# Patient Record
Sex: FEMALE | Race: WHITE | NOT HISPANIC OR LATINO | Employment: PART TIME | URBAN - METROPOLITAN AREA
[De-identification: names, ages, dates, MRNs, and addresses within clinical notes are randomized per-mention and may not be internally consistent; named-entity substitution may affect disease eponyms.]

---

## 2017-03-21 ENCOUNTER — OFFICE VISIT (OUTPATIENT)
Dept: FAMILY MEDICINE CLINIC | Facility: CLINIC | Age: 42
End: 2017-03-21

## 2017-03-21 VITALS
RESPIRATION RATE: 16 BRPM | HEART RATE: 68 BPM | TEMPERATURE: 98.4 F | DIASTOLIC BLOOD PRESSURE: 80 MMHG | SYSTOLIC BLOOD PRESSURE: 130 MMHG | BODY MASS INDEX: 26.86 KG/M2 | WEIGHT: 177.2 LBS | HEIGHT: 68 IN

## 2017-03-21 DIAGNOSIS — G89.29 CHRONIC LEFT-SIDED THORACIC BACK PAIN: ICD-10-CM

## 2017-03-21 DIAGNOSIS — M25.562 ACUTE PAIN OF LEFT KNEE: ICD-10-CM

## 2017-03-21 DIAGNOSIS — Z76.89 ENCOUNTER TO ESTABLISH CARE: Primary | ICD-10-CM

## 2017-03-21 DIAGNOSIS — M54.6 CHRONIC LEFT-SIDED THORACIC BACK PAIN: ICD-10-CM

## 2017-03-21 DIAGNOSIS — R11.2 NON-INTRACTABLE VOMITING WITH NAUSEA, UNSPECIFIED VOMITING TYPE: ICD-10-CM

## 2017-03-21 PROCEDURE — 99204 OFFICE O/P NEW MOD 45 MIN: CPT | Performed by: NURSE PRACTITIONER
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
## 2017-04-21 ENCOUNTER — HOSPITAL ENCOUNTER (OUTPATIENT)
Dept: GENERAL RADIOLOGY | Facility: HOSPITAL | Age: 42
Discharge: HOME | End: 2017-04-21
Admitting: NURSE PRACTITIONER

## 2017-04-21 ENCOUNTER — OFFICE VISIT (OUTPATIENT)
Dept: FAMILY MEDICINE CLINIC | Facility: CLINIC | Age: 42
End: 2017-04-21

## 2017-04-21 VITALS
TEMPERATURE: 98.2 F | WEIGHT: 168 LBS | BODY MASS INDEX: 25.46 KG/M2 | SYSTOLIC BLOOD PRESSURE: 130 MMHG | HEIGHT: 68 IN | DIASTOLIC BLOOD PRESSURE: 80 MMHG | RESPIRATION RATE: 16 BRPM | HEART RATE: 80 BPM

## 2017-04-21 DIAGNOSIS — R07.89 CHEST TIGHTNESS OR PRESSURE: Primary | ICD-10-CM

## 2017-04-21 DIAGNOSIS — Z72.0 TOBACCO ABUSE: ICD-10-CM

## 2017-04-21 DIAGNOSIS — F41.9 ANXIETY: ICD-10-CM

## 2017-04-21 PROCEDURE — 99214 OFFICE O/P EST MOD 30 MIN: CPT | Performed by: NURSE PRACTITIONER

## 2017-04-21 PROCEDURE — 99406 BEHAV CHNG SMOKING 3-10 MIN: CPT | Performed by: NURSE PRACTITIONER

## 2017-04-21 PROCEDURE — 71020 HC CHEST PA AND LATERAL: CPT

## 2017-04-21 PROCEDURE — 93000 ELECTROCARDIOGRAM COMPLETE: CPT | Performed by: NURSE PRACTITIONER

## 2017-04-21 RX ORDER — TIZANIDINE 4 MG/1
4 TABLET ORAL EVERY 8 HOURS PRN
Qty: 60 TABLET | Refills: 0 | Status: SHIPPED | OUTPATIENT
Start: 2017-04-21 | End: 2019-07-09

## 2017-04-21 RX ORDER — DULOXETIN HYDROCHLORIDE 30 MG/1
30 CAPSULE, DELAYED RELEASE ORAL DAILY
Qty: 30 CAPSULE | Refills: 1 | Status: SHIPPED | OUTPATIENT
Start: 2017-04-21 | End: 2017-04-24
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
## 2017-04-24 ENCOUNTER — OFFICE VISIT (OUTPATIENT)
Dept: FAMILY MEDICINE CLINIC | Facility: CLINIC | Age: 42
End: 2017-04-24

## 2017-04-24 VITALS
TEMPERATURE: 98.5 F | HEIGHT: 68 IN | BODY MASS INDEX: 26.16 KG/M2 | SYSTOLIC BLOOD PRESSURE: 118 MMHG | HEART RATE: 88 BPM | WEIGHT: 172.6 LBS | RESPIRATION RATE: 16 BRPM | DIASTOLIC BLOOD PRESSURE: 78 MMHG

## 2017-04-24 DIAGNOSIS — R10.9 LEFT SIDED ABDOMINAL PAIN: Primary | ICD-10-CM

## 2017-04-24 DIAGNOSIS — K58.2 IRRITABLE BOWEL SYNDROME WITH BOTH CONSTIPATION AND DIARRHEA: ICD-10-CM

## 2017-04-24 DIAGNOSIS — G89.29 CHRONIC LEFT-SIDED THORACIC BACK PAIN: ICD-10-CM

## 2017-04-24 DIAGNOSIS — M54.6 CHRONIC LEFT-SIDED THORACIC BACK PAIN: ICD-10-CM

## 2017-04-24 DIAGNOSIS — Z13.29 SCREENING FOR THYROID DISORDER: ICD-10-CM

## 2017-04-24 PROCEDURE — 99214 OFFICE O/P EST MOD 30 MIN: CPT | Performed by: FAMILY MEDICINE

## 2017-04-24 RX ORDER — DICYCLOMINE HCL 20 MG
20 TABLET ORAL EVERY 6 HOURS
Qty: 120 TABLET | Refills: 1 | Status: SHIPPED | OUTPATIENT
Start: 2017-04-24 | End: 2017-08-25 | Stop reason: SDUPTHER

## 2017-04-24 NOTE — PROGRESS NOTES
Subjective   Pari Ortiz is a 41 y.o. female.     History of Present Illness   Here for follow up of left sided chest pain and left back pain  She was recently seen in our office 3 days ago, evaluated with CXR and EKG. Was thought that it was related to anxiety, started Duloxetine. Patient states that she was having a bad day when she was in office last and doesn't want to be treated with duloxetine. She states that she doesn't have depression or anxiety, so doesn't want to take the medication.   Pain has been on and off for years, progressively getting worse. She can push her LUQ and feel pain in her spine.   She started a more physical job in Jan 2017, symptoms have worsened.   Tizanidine has improved symptoms some.     She also has stomach pain with diarrhea and constipation.   Long history of constipation and diarrhea, not related to any specific foods. When diarrhea occurs, it is described as very watery.  +abdominal bloating and discomfort  Denies acid reflux symptoms  She has had a colonoscopy and EGD done in 2011, biopsies were taken but she didn't go to the follow up appointment because she lost insurance coverage.   +Family history Diverticulitis and IBS     The following portions of the patient's history were reviewed and updated as appropriate: allergies, current medications, past family history, past medical history, past social history, past surgical history and problem list.    Review of Systems   Constitutional: Negative for chills and fever.   Respiratory: Negative.    Cardiovascular: Negative.    Gastrointestinal: Positive for abdominal distention, abdominal pain, constipation and diarrhea. Negative for nausea and vomiting.   Musculoskeletal: Positive for back pain.   Neurological: Negative.  Negative for weakness and numbness.       Objective   Physical Exam   Constitutional: She is oriented to person, place, and time. She appears well-developed and well-nourished.   HENT:   Head:  Normocephalic and atraumatic.   Right Ear: External ear normal.   Left Ear: External ear normal.   Nose: Nose normal.   Eyes: Conjunctivae and EOM are normal.   Cardiovascular: Normal rate, regular rhythm and normal heart sounds.    Pulmonary/Chest: Effort normal and breath sounds normal.   Abdominal: Soft. Bowel sounds are normal. She exhibits no distension and no mass. There is no tenderness. There is no rebound and no guarding.   Musculoskeletal: She exhibits no deformity.        Thoracic back: She exhibits tenderness and spasm (left lower thoracic).   Neurological: She is alert and oriented to person, place, and time. No cranial nerve deficit.   Skin: Skin is warm and dry.   Psychiatric: She has a normal mood and affect. Her behavior is normal.   Nursing note and vitals reviewed.      Assessment/Plan   Pari was seen today for follow up on l side chest pain & back pain.    Diagnoses and all orders for this visit:    Left sided abdominal pain  -     Food Allergy Profile  -     CBC Auto Differential  -     Comprehensive Metabolic Panel    Chronic left-sided thoracic back pain  -     CBC Auto Differential  -     Comprehensive Metabolic Panel  -     Ambulatory Referral to Physical Therapy Evaluate and treat    Irritable bowel syndrome with both constipation and diarrhea  -     Food Allergy Profile  -     CBC Auto Differential  -     Comprehensive Metabolic Panel  -     dicyclomine (BENTYL) 20 MG tablet; Take 1 tablet by mouth Every 6 (Six) Hours.    Screening for thyroid disorder  -     TSH      From her description, the back pain seems to be musculoskeletal in nature. Referred to PT for further evaluation and treatment. Continue Zanaflex as needed.   Labs ordered to evaluate abdominal pain. Possibly IBS, has had previous scopes but unsure what final diagnosis was. Will start treatment with Bentyl, she is to notify me if symptoms worsen. Consider referring back to gastroenterology if symptoms don't improve.  Encouraged her to follow FODMAP diet to improve abdominal pain and bloating.

## 2017-04-24 NOTE — PATIENT INSTRUCTIONS
Go to the nearest ER or return to clinic if symptoms worsen, fever/chill develop    Diet for Irritable Bowel Syndrome  When you have irritable bowel syndrome (IBS), the foods you eat and your eating habits are very important. IBS may cause various symptoms, such as abdominal pain, constipation, or diarrhea. Choosing the right foods can help ease discomfort caused by these symptoms. Work with your health care provider and dietitian to find the best eating plan to help control your symptoms.  WHAT GENERAL GUIDELINES DO I NEED TO FOLLOW?  · Keep a food diary. This will help you identify foods that cause symptoms. Write down:    What you eat and when.    What symptoms you have.    When symptoms occur in relation to your meals.  · Avoid foods that cause symptoms. Talk with your dietitian about other ways to get the same nutrients that are in these foods.  · Eat more foods that contain fiber. Take a fiber supplement if directed by your dietitian.  · Eat your meals slowly, in a relaxed setting.  · Aim to eat 5-6 small meals per day. Do not skip meals.  · Drink enough fluids to keep your urine clear or pale yellow.  · Ask your health care provider if you should take an over-the-counter probiotic during flare-ups to help restore healthy gut bacteria.  · If you have cramping or diarrhea, try making your meals low in fat and high in carbohydrates. Examples of carbohydrates are pasta, rice, whole grain breads and cereals, fruits, and vegetables.  · If dairy products cause your symptoms to flare up, try eating less of them. You might be able to handle yogurt better than other dairy products because it contains bacteria that help with digestion.  WHAT FOODS ARE NOT RECOMMENDED?  The following are some foods and drinks that may worsen your symptoms:  · Fatty foods, such as French fries.  · Milk products, such as cheese or ice cream.  · Chocolate.  · Alcohol.  · Products with caffeine, such as coffee.  · Carbonated drinks, such as  soda.  The items listed above may not be a complete list of foods and beverages to avoid. Contact your dietitian for more information.  WHAT FOODS ARE GOOD SOURCES OF FIBER?  Your health care provider or dietitian may recommend that you eat more foods that contain fiber. Fiber can help reduce constipation and other IBS symptoms. Add foods with fiber to your diet a little at a time so that your body can get used to them. Too much fiber at once might cause gas and swelling of your abdomen. The following are some foods that are good sources of fiber:  · Apples.  · Peaches.  · Pears.  · Berries.  · Figs.  · Broccoli (raw).  · Cabbage.  · Carrots.  · Raw peas.  · Kidney beans.  · Lima beans.  · Whole grain bread.  · Whole grain cereal.  FOR MORE INFORMATION   International Foundation for Functional Gastrointestinal Disorders: www.iffgd.org  National [place] of Diabetes and Digestive and Kidney Diseases: www.niddk.nih.gov/health-information/health-topics/digestive-diseases/ibs/Pages/facts.aspx     This information is not intended to replace advice given to you by your health care provider. Make sure you discuss any questions you have with your health care provider.     Document Released: 03/09/2005 Document Revised: 01/08/2016 Document Reviewed: 03/20/2015  [employer] Interactive Patient Education ©2016 [employer]

## 2017-04-26 LAB
ALBUMIN SERPL-MCNC: 4.4 G/DL (ref 3.5–5.5)
ALBUMIN/GLOB SERPL: 1.8 {RATIO} (ref 1.2–2.2)
ALP SERPL-CCNC: 47 IU/L (ref 39–117)
ALT SERPL-CCNC: 11 IU/L (ref 0–32)
AST SERPL-CCNC: 12 IU/L (ref 0–40)
BASOPHILS # BLD AUTO: 0 X10E3/UL (ref 0–0.2)
BASOPHILS NFR BLD AUTO: 1 %
BILIRUB SERPL-MCNC: 0.3 MG/DL (ref 0–1.2)
BUN SERPL-MCNC: 8 MG/DL (ref 6–20)
BUN/CREAT SERPL: 13 (ref 9–23)
CALCIUM SERPL-MCNC: 9.2 MG/DL (ref 8.7–10.2)
CHLORIDE SERPL-SCNC: 104 MMOL/L (ref 96–106)
CLAM IGE QN: <0.1 KU/L
CO2 SERPL-SCNC: 21 MMOL/L (ref 18–29)
CODFISH IGE QN: <0.1 KU/L
CONV CLASS DESCRIPTION: NORMAL
CORN IGE QN: <0.1 KU/L
COW MILK IGE QN: <0.1 KU/L
CREAT SERPL-MCNC: 0.61 MG/DL (ref 0.57–1)
EGG WHITE IGE QN: <0.1 KU/L
EOSINOPHIL # BLD AUTO: 0.1 X10E3/UL (ref 0–0.4)
EOSINOPHIL NFR BLD AUTO: 2 %
ERYTHROCYTE [DISTWIDTH] IN BLOOD BY AUTOMATED COUNT: 12.6 % (ref 12.3–15.4)
GLOBULIN SER CALC-MCNC: 2.5 G/DL (ref 1.5–4.5)
GLUCOSE SERPL-MCNC: 80 MG/DL (ref 65–99)
HCT VFR BLD AUTO: 41.8 % (ref 34–46.6)
HGB BLD-MCNC: 14.3 G/DL (ref 11.1–15.9)
IMM GRANULOCYTES # BLD: 0 X10E3/UL (ref 0–0.1)
IMM GRANULOCYTES NFR BLD: 0 %
LYMPHOCYTES # BLD AUTO: 2.1 X10E3/UL (ref 0.7–3.1)
LYMPHOCYTES NFR BLD AUTO: 29 %
MCH RBC QN AUTO: 33.6 PG (ref 26.6–33)
MCHC RBC AUTO-ENTMCNC: 34.2 G/DL (ref 31.5–35.7)
MCV RBC AUTO: 98 FL (ref 79–97)
MONOCYTES # BLD AUTO: 0.5 X10E3/UL (ref 0.1–0.9)
MONOCYTES NFR BLD AUTO: 7 %
NEUTROPHILS # BLD AUTO: 4.5 X10E3/UL (ref 1.4–7)
NEUTROPHILS NFR BLD AUTO: 61 %
PEANUT IGE QN: <0.1 KU/L
PLATELET # BLD AUTO: 305 X10E3/UL (ref 150–379)
POTASSIUM SERPL-SCNC: 4.8 MMOL/L (ref 3.5–5.2)
PROT SERPL-MCNC: 6.9 G/DL (ref 6–8.5)
RBC # BLD AUTO: 4.26 X10E6/UL (ref 3.77–5.28)
SCALLOP IGE QN: <0.1 KU/L
SESAME SEED IGE QN: <0.1 KU/L
SHRIMP IGE QN: <0.1 KU/L
SODIUM SERPL-SCNC: 142 MMOL/L (ref 134–144)
SOYBEAN IGE QN: <0.1 KU/L
TSH SERPL DL<=0.005 MIU/L-ACNC: 1.74 UIU/ML (ref 0.45–4.5)
WALNUT IGE QN: <0.1 KU/L
WBC # BLD AUTO: 7.3 X10E3/UL (ref 3.4–10.8)
WHEAT IGE QN: <0.1 KU/L

## 2017-05-03 ENCOUNTER — TELEPHONE (OUTPATIENT)
Dept: FAMILY MEDICINE CLINIC | Facility: CLINIC | Age: 42
End: 2017-05-03

## 2017-05-03 ENCOUNTER — OFFICE VISIT (OUTPATIENT)
Dept: FAMILY MEDICINE CLINIC | Facility: CLINIC | Age: 42
End: 2017-05-03

## 2017-05-03 VITALS
BODY MASS INDEX: 25.22 KG/M2 | WEIGHT: 166.4 LBS | HEIGHT: 68 IN | TEMPERATURE: 98.4 F | RESPIRATION RATE: 16 BRPM | HEART RATE: 80 BPM | SYSTOLIC BLOOD PRESSURE: 120 MMHG | DIASTOLIC BLOOD PRESSURE: 75 MMHG

## 2017-05-03 DIAGNOSIS — M54.6 CHRONIC LEFT-SIDED THORACIC BACK PAIN: Primary | ICD-10-CM

## 2017-05-03 DIAGNOSIS — M54.16 LUMBAR RADICULAR PAIN: ICD-10-CM

## 2017-05-03 DIAGNOSIS — M54.14 THORACIC RADICULITIS: ICD-10-CM

## 2017-05-03 DIAGNOSIS — M54.50 CHRONIC MIDLINE LOW BACK PAIN WITHOUT SCIATICA: ICD-10-CM

## 2017-05-03 DIAGNOSIS — G89.29 CHRONIC LEFT-SIDED THORACIC BACK PAIN: Primary | ICD-10-CM

## 2017-05-03 DIAGNOSIS — G89.29 CHRONIC MIDLINE LOW BACK PAIN WITHOUT SCIATICA: ICD-10-CM

## 2017-05-03 PROCEDURE — 99214 OFFICE O/P EST MOD 30 MIN: CPT | Performed by: FAMILY MEDICINE

## 2017-05-03 RX ORDER — NAPROXEN 500 MG/1
TABLET ORAL
Refills: 0 | COMMUNITY
Start: 2017-03-17 | End: 2017-08-07

## 2017-05-03 RX ORDER — PREDNISONE 20 MG/1
TABLET ORAL
Qty: 16 TABLET | Refills: 0 | Status: SHIPPED | OUTPATIENT
Start: 2017-05-03 | End: 2017-05-25

## 2017-05-08 ENCOUNTER — TELEPHONE (OUTPATIENT)
Dept: FAMILY MEDICINE CLINIC | Facility: CLINIC | Age: 42
End: 2017-05-08

## 2017-05-08 DIAGNOSIS — M54.14 THORACIC RADICULITIS: ICD-10-CM

## 2017-05-08 DIAGNOSIS — G89.29 CHRONIC LEFT-SIDED THORACIC BACK PAIN: Primary | ICD-10-CM

## 2017-05-08 DIAGNOSIS — M54.6 CHRONIC LEFT-SIDED THORACIC BACK PAIN: Primary | ICD-10-CM

## 2017-05-08 DIAGNOSIS — M54.50 CHRONIC MIDLINE LOW BACK PAIN WITHOUT SCIATICA: ICD-10-CM

## 2017-05-08 DIAGNOSIS — G89.29 CHRONIC MIDLINE LOW BACK PAIN WITHOUT SCIATICA: ICD-10-CM

## 2017-05-12 ENCOUNTER — TELEPHONE (OUTPATIENT)
Dept: FAMILY MEDICINE CLINIC | Facility: CLINIC | Age: 42
End: 2017-05-12

## 2017-05-25 ENCOUNTER — HOSPITAL ENCOUNTER (OUTPATIENT)
Dept: CT IMAGING | Facility: HOSPITAL | Age: 42
Discharge: HOME | End: 2017-05-25
Attending: FAMILY MEDICINE | Admitting: FAMILY MEDICINE

## 2017-05-25 ENCOUNTER — OFFICE VISIT (OUTPATIENT)
Dept: FAMILY MEDICINE CLINIC | Facility: CLINIC | Age: 42
End: 2017-05-25

## 2017-05-25 ENCOUNTER — TELEPHONE (OUTPATIENT)
Dept: FAMILY MEDICINE CLINIC | Facility: CLINIC | Age: 42
End: 2017-05-25

## 2017-05-25 VITALS
BODY MASS INDEX: 26.23 KG/M2 | WEIGHT: 170 LBS | SYSTOLIC BLOOD PRESSURE: 122 MMHG | HEART RATE: 84 BPM | OXYGEN SATURATION: 99 % | TEMPERATURE: 97.2 F | DIASTOLIC BLOOD PRESSURE: 72 MMHG

## 2017-05-25 DIAGNOSIS — R11.2 NAUSEA AND VOMITING, INTRACTABILITY OF VOMITING NOT SPECIFIED, UNSPECIFIED VOMITING TYPE: ICD-10-CM

## 2017-05-25 DIAGNOSIS — R10.9 LEFT LATERAL ABDOMINAL PAIN: Primary | ICD-10-CM

## 2017-05-25 DIAGNOSIS — R19.7 DIARRHEA, UNSPECIFIED TYPE: ICD-10-CM

## 2017-05-25 PROCEDURE — 99214 OFFICE O/P EST MOD 30 MIN: CPT | Performed by: FAMILY MEDICINE

## 2017-05-25 PROCEDURE — 0 IOPAMIDOL 61 % SOLUTION: Performed by: FAMILY MEDICINE

## 2017-05-25 PROCEDURE — 74177 CT ABD & PELVIS W/CONTRAST: CPT

## 2017-05-25 RX ORDER — GABAPENTIN 100 MG/1
CAPSULE ORAL
Refills: 3 | COMMUNITY
Start: 2017-05-04 | End: 2017-08-07

## 2017-05-25 RX ADMIN — BARIUM SULFATE 450 ML: 21 SUSPENSION ORAL at 15:30

## 2017-05-25 RX ADMIN — IOPAMIDOL 95 ML: 612 INJECTION, SOLUTION INTRAVENOUS at 15:30

## 2017-07-06 ENCOUNTER — OFFICE VISIT (OUTPATIENT)
Dept: FAMILY MEDICINE CLINIC | Facility: CLINIC | Age: 42
End: 2017-07-06

## 2017-07-06 VITALS
BODY MASS INDEX: 25.76 KG/M2 | HEART RATE: 87 BPM | TEMPERATURE: 98.3 F | OXYGEN SATURATION: 100 % | WEIGHT: 170 LBS | HEIGHT: 68 IN | SYSTOLIC BLOOD PRESSURE: 120 MMHG | DIASTOLIC BLOOD PRESSURE: 82 MMHG

## 2017-07-06 DIAGNOSIS — R11.2 NAUSEA AND VOMITING, INTRACTABILITY OF VOMITING NOT SPECIFIED, UNSPECIFIED VOMITING TYPE: ICD-10-CM

## 2017-07-06 DIAGNOSIS — F41.9 ANXIETY: Primary | ICD-10-CM

## 2017-07-06 PROCEDURE — 99213 OFFICE O/P EST LOW 20 MIN: CPT | Performed by: FAMILY MEDICINE

## 2017-07-06 RX ORDER — DULOXETIN HYDROCHLORIDE 30 MG/1
CAPSULE, DELAYED RELEASE ORAL
Qty: 60 CAPSULE | Refills: 1 | Status: SHIPPED | OUTPATIENT
Start: 2017-07-06 | End: 2017-08-07

## 2017-07-06 RX ORDER — HYDROXYZINE PAMOATE 25 MG/1
25-50 CAPSULE ORAL 3 TIMES DAILY PRN
Qty: 90 CAPSULE | Refills: 0 | Status: SHIPPED | OUTPATIENT
Start: 2017-07-06 | End: 2017-08-25 | Stop reason: SDUPTHER

## 2017-07-06 RX ORDER — PROMETHAZINE HYDROCHLORIDE 12.5 MG/1
12.5 TABLET ORAL EVERY 6 HOURS PRN
Qty: 15 TABLET | Refills: 0 | Status: SHIPPED | OUTPATIENT
Start: 2017-07-06 | End: 2019-07-09

## 2017-07-06 NOTE — PATIENT INSTRUCTIONS
Go to the nearest ER or return to clinic if symptoms worsen, fever/chill develop    Generalized Anxiety Disorder  Generalized anxiety disorder (ALBERT) is a mental disorder. It interferes with life functions, including relationships, work, and school.  ALBERT is different from normal anxiety, which everyone experiences at some point in their lives in response to specific life events and activities. Normal anxiety actually helps us prepare for and get through these life events and activities. Normal anxiety goes away after the event or activity is over.   ALBERT causes anxiety that is not necessarily related to specific events or activities. It also causes excess anxiety in proportion to specific events or activities. The anxiety associated with ALBERT is also difficult to control. ALBERT can vary from mild to severe. People with severe ALBERT can have intense waves of anxiety with physical symptoms (panic attacks).   SYMPTOMS  The anxiety and worry associated with ALBERT are difficult to control. This anxiety and worry are related to many life events and activities and also occur more days than not for 6 months or longer. People with ALBERT also have three or more of the following symptoms (one or more in children):  · Restlessness.    · Fatigue.  · Difficulty concentrating.    · Irritability.  · Muscle tension.  · Difficulty sleeping or unsatisfying sleep.  DIAGNOSIS  ALBERT is diagnosed through an assessment by your health care provider. Your health care provider will ask you questions about your mood, physical symptoms, and events in your life. Your health care provider may ask you about your medical history and use of alcohol or drugs, including prescription medicines. Your health care provider may also do a physical exam and blood tests. Certain medical conditions and the use of certain substances can cause symptoms similar to those associated with ALBERT. Your health care provider may refer you to a mental health specialist for further  evaluation.  TREATMENT  The following therapies are usually used to treat ALBERT:   · Medication. Antidepressant medication usually is prescribed for long-term daily control. Antianxiety medicines may be added in severe cases, especially when panic attacks occur.    · Talk therapy (psychotherapy). Certain types of talk therapy can be helpful in treating ALBERT by providing support, education, and guidance. A form of talk therapy called cognitive behavioral therapy can teach you healthy ways to think about and react to daily life events and activities.  · Stress management techniques. These include yoga, meditation, and exercise and can be very helpful when they are practiced regularly.  A mental health specialist can help determine which treatment is best for you. Some people see improvement with one therapy. However, other people require a combination of therapies.     This information is not intended to replace advice given to you by your health care provider. Make sure you discuss any questions you have with your health care provider.     Document Released: 04/14/2014 Document Revised: 01/08/2016 Document Reviewed: 04/14/2014  [employer] [place] Interactive Patient Education ©2017 [employer]

## 2017-07-06 NOTE — PROGRESS NOTES
Subjective   Pari Ortiz is a 42 y.o. female.     History of Present Illness   Symptoms started this morning, nausea/vomiting, diarrhea, chills.  She has drank only water today and tolerated. Hasn't eaten today.   She admits that she has a lot of increased stressed, especially at work. She is currently fighting harassment in the work place. She is also requesting so help for anxiety/stress, as she thinks this is likely the source of a lot of her symptoms. Previously started on Cymbalta, however was convinced that she didn't have any anxiety so only took it for 1-2 days.     The following portions of the patient's history were reviewed and updated as appropriate: allergies, current medications, past family history, past medical history, past social history, past surgical history and problem list.    Review of Systems   Constitutional: Positive for chills. Negative for fever.   Respiratory: Negative for cough and shortness of breath.    Cardiovascular: Negative for chest pain and palpitations.   Gastrointestinal: Positive for diarrhea, nausea and vomiting.   Neurological: Negative.    Psychiatric/Behavioral: Negative for agitation, self-injury, sleep disturbance and suicidal ideas. The patient is nervous/anxious.        Objective   Physical Exam   Constitutional: She is oriented to person, place, and time. She appears well-developed and well-nourished.   HENT:   Head: Normocephalic and atraumatic.   Right Ear: External ear normal.   Left Ear: External ear normal.   Nose: Nose normal.   Eyes: Conjunctivae are normal.   Neck: Normal range of motion.   Cardiovascular: Normal rate, regular rhythm and normal heart sounds.    Pulmonary/Chest: Effort normal and breath sounds normal.   Abdominal: Soft. Bowel sounds are normal. There is no tenderness.   Musculoskeletal: She exhibits no edema or deformity.        Thoracic back: She exhibits tenderness. She exhibits no bony tenderness.   Neurological: She is alert and  oriented to person, place, and time. No cranial nerve deficit.   Skin: Skin is warm and dry.   Psychiatric: Her behavior is normal. Her mood appears anxious.   Tearful when discussing current work situation   Nursing note and vitals reviewed.      Assessment/Plan   Pari was seen today for vomiting.    Diagnoses and all orders for this visit:    Anxiety  -     DULoxetine (CYMBALTA) 30 MG capsule; Take 1 tab po daily x 1 week, then increase to 2 tabs po daily  -     hydrOXYzine (VISTARIL) 25 MG capsule; Take 1-2 capsules by mouth 3 (Three) Times a Day As Needed for Anxiety.    Nausea and vomiting, intractability of vomiting not specified, unspecified vomiting type  -     promethazine (PHENERGAN) 12.5 MG tablet; Take 1 tablet by mouth Every 6 (Six) Hours As Needed for Nausea or Vomiting.      I agree that a lot of her symptoms including stomach upset and back pain are likely anxiety related.  She has had multiple workups done without any positive findings.  She is willing to start Cymbalta for treatment of this condition.  Also provided as needed Vistaril for increased anxiety and panic attacks.  Phenergan provided for nausea and vomiting.  Increase fluid intake over the next 48-72 hours.

## 2017-08-07 ENCOUNTER — OFFICE VISIT (OUTPATIENT)
Dept: FAMILY MEDICINE CLINIC | Facility: CLINIC | Age: 42
End: 2017-08-07

## 2017-08-07 VITALS
HEART RATE: 76 BPM | BODY MASS INDEX: 25.94 KG/M2 | SYSTOLIC BLOOD PRESSURE: 110 MMHG | HEIGHT: 68 IN | RESPIRATION RATE: 16 BRPM | DIASTOLIC BLOOD PRESSURE: 74 MMHG | WEIGHT: 171.2 LBS | TEMPERATURE: 97.8 F

## 2017-08-07 DIAGNOSIS — F17.219 CIGARETTE NICOTINE DEPENDENCE WITH NICOTINE-INDUCED DISORDER: ICD-10-CM

## 2017-08-07 DIAGNOSIS — R11.2 NON-INTRACTABLE VOMITING WITH NAUSEA, UNSPECIFIED VOMITING TYPE: ICD-10-CM

## 2017-08-07 DIAGNOSIS — K64.9 HEMORRHOIDS, UNSPECIFIED HEMORRHOID TYPE: ICD-10-CM

## 2017-08-07 DIAGNOSIS — R19.7 DIARRHEA, UNSPECIFIED TYPE: Primary | ICD-10-CM

## 2017-08-07 PROCEDURE — 99406 BEHAV CHNG SMOKING 3-10 MIN: CPT | Performed by: PHYSICIAN ASSISTANT

## 2017-08-07 PROCEDURE — 99213 OFFICE O/P EST LOW 20 MIN: CPT | Performed by: PHYSICIAN ASSISTANT

## 2017-08-07 RX ORDER — DULOXETIN HYDROCHLORIDE 60 MG/1
CAPSULE, DELAYED RELEASE ORAL
COMMUNITY
Start: 2017-08-01 | End: 2017-08-07

## 2017-08-09 ENCOUNTER — TELEPHONE (OUTPATIENT)
Dept: FAMILY MEDICINE CLINIC | Facility: CLINIC | Age: 42
End: 2017-08-09

## 2017-08-09 NOTE — TELEPHONE ENCOUNTER
----- Message from Coleen Lee sent at 8/9/2017 10:17 AM EDT -----  Contact: DASHAWN; LETTER REQUEST   PT IS WANTING TO KNOW IF SHE MAY HAVE THE WHOLE WEEK OFF DUE TO HER REASONING FOR HER LAS VISIT. SHE HAS AN APPT WITH SPECIALIST  NEXT WED     CALL BACK   [phone]

## 2017-08-18 ENCOUNTER — TRANSCRIBE ORDERS (OUTPATIENT)
Dept: ADMINISTRATIVE | Facility: HOSPITAL | Age: 42
End: 2017-08-18

## 2017-08-18 ENCOUNTER — TELEPHONE (OUTPATIENT)
Dept: FAMILY MEDICINE CLINIC | Facility: CLINIC | Age: 42
End: 2017-08-18

## 2017-08-18 DIAGNOSIS — R11.2 NAUSEA VOMITING AND DIARRHEA: Primary | ICD-10-CM

## 2017-08-18 DIAGNOSIS — R10.84 ABDOMINAL PAIN, GENERALIZED: ICD-10-CM

## 2017-08-18 DIAGNOSIS — R19.7 NAUSEA VOMITING AND DIARRHEA: Primary | ICD-10-CM

## 2017-08-21 ENCOUNTER — HOSPITAL ENCOUNTER (OUTPATIENT)
Dept: NUCLEAR MEDICINE | Facility: HOSPITAL | Age: 42
Discharge: HOME | End: 2017-08-21

## 2017-08-21 VITALS — WEIGHT: 172 LBS | BODY MASS INDEX: 26.54 KG/M2

## 2017-08-21 DIAGNOSIS — R10.84 ABDOMINAL PAIN, GENERALIZED: ICD-10-CM

## 2017-08-21 DIAGNOSIS — R11.2 NAUSEA VOMITING AND DIARRHEA: ICD-10-CM

## 2017-08-21 DIAGNOSIS — R19.7 NAUSEA VOMITING AND DIARRHEA: ICD-10-CM

## 2017-08-21 PROCEDURE — 0 TECHNETIUM TC 99M MEBROFENIN KIT: Performed by: INTERNAL MEDICINE

## 2017-08-21 PROCEDURE — 78227 HEPATOBIL SYST IMAGE W/DRUG: CPT

## 2017-08-21 PROCEDURE — 25010000002 SINCALIDE PER 5 MCG: Performed by: INTERNAL MEDICINE

## 2017-08-21 PROCEDURE — A9537 TC99M MEBROFENIN: HCPCS | Performed by: INTERNAL MEDICINE

## 2017-08-21 RX ORDER — KIT FOR THE PREPARATION OF TECHNETIUM TC 99M MEBROFENIN 45 MG/10ML
1 INJECTION, POWDER, LYOPHILIZED, FOR SOLUTION INTRAVENOUS
Status: COMPLETED | OUTPATIENT
Start: 2017-08-21 | End: 2017-08-21

## 2017-08-21 RX ADMIN — MEBROFENIN 1 DOSE: 45 INJECTION, POWDER, LYOPHILIZED, FOR SOLUTION INTRAVENOUS at 13:52

## 2017-08-21 RX ADMIN — SINCALIDE 1.6 MCG: 5 INJECTION, POWDER, LYOPHILIZED, FOR SOLUTION INTRAVENOUS at 15:19

## 2017-08-22 ENCOUNTER — TELEPHONE (OUTPATIENT)
Dept: FAMILY MEDICINE CLINIC | Facility: CLINIC | Age: 42
End: 2017-08-22

## 2017-08-25 ENCOUNTER — OFFICE VISIT (OUTPATIENT)
Dept: FAMILY MEDICINE CLINIC | Facility: CLINIC | Age: 42
End: 2017-08-25

## 2017-08-25 VITALS
HEART RATE: 100 BPM | WEIGHT: 172 LBS | BODY MASS INDEX: 26.07 KG/M2 | RESPIRATION RATE: 20 BRPM | HEIGHT: 68 IN | TEMPERATURE: 97.9 F | DIASTOLIC BLOOD PRESSURE: 80 MMHG | SYSTOLIC BLOOD PRESSURE: 126 MMHG

## 2017-08-25 DIAGNOSIS — R10.9 ABDOMINAL PAIN, LEFT LATERAL: ICD-10-CM

## 2017-08-25 DIAGNOSIS — F41.9 ANXIETY: ICD-10-CM

## 2017-08-25 DIAGNOSIS — R19.7 DIARRHEA, UNSPECIFIED TYPE: Primary | ICD-10-CM

## 2017-08-25 DIAGNOSIS — K64.4 EXTERNAL HEMORRHOID: ICD-10-CM

## 2017-08-25 DIAGNOSIS — K58.2 IRRITABLE BOWEL SYNDROME WITH BOTH CONSTIPATION AND DIARRHEA: ICD-10-CM

## 2017-08-25 PROCEDURE — 99214 OFFICE O/P EST MOD 30 MIN: CPT | Performed by: FAMILY MEDICINE

## 2017-08-25 RX ORDER — DICYCLOMINE HCL 20 MG
20 TABLET ORAL EVERY 6 HOURS
Qty: 120 TABLET | Refills: 2 | Status: SHIPPED | OUTPATIENT
Start: 2017-08-25 | End: 2019-07-09

## 2017-08-25 RX ORDER — HYDROXYZINE PAMOATE 25 MG/1
25-50 CAPSULE ORAL 3 TIMES DAILY PRN
Qty: 90 CAPSULE | Refills: 0 | Status: SHIPPED | OUTPATIENT
Start: 2017-08-25 | End: 2019-07-09

## 2017-08-25 RX ORDER — TRAMADOL HYDROCHLORIDE 50 MG/1
50-100 TABLET ORAL EVERY 6 HOURS PRN
Qty: 24 TABLET | Refills: 0 | Status: SHIPPED | OUTPATIENT
Start: 2017-08-25 | End: 2017-09-18

## 2017-08-25 NOTE — PROGRESS NOTES
Subjective   Pari Ortiz is a 42 y.o. female.     History of Present Illness   Still having diarrhea and left sided abdominal pain  Recently completed EGD and colonoscopy, biopsy results are pending. She would like to see a different gastroenterologist.   She is still having diarrhea frequently, generalized abdominal pain. Cramping in lower abdomen when she sits down, that started today.   Recently taken days off from work, having to leave early due to abdominal pain and diarrhea.   She has only been taking Bentyl once per day. If she takes it 4 times a day, it makes her feel funny.   She is having difficulty with hemorrhoids. Requesting medication to improve symptoms.     She is requesting pain medication to have on hand if pain is severe. She would like to return to work on Monday.     She has been taking Vistaril prn for anxiety. Requesting refill of medication.   It does improve her anxiety, only sometimes it is a slow onset.     The following portions of the patient's history were reviewed and updated as appropriate: allergies, current medications, past family history, past medical history, past social history, past surgical history and problem list.    Review of Systems   Constitutional: Negative for chills and fever.   Respiratory: Negative.    Cardiovascular: Negative.    Gastrointestinal: Positive for abdominal pain and diarrhea. Negative for nausea and vomiting.   Genitourinary: Negative for dysuria and hematuria.   Neurological: Negative for dizziness and headaches.   Hematological: Does not bruise/bleed easily.       Objective   Physical Exam   Constitutional: She is oriented to person, place, and time. She appears well-developed and well-nourished.   HENT:   Head: Normocephalic and atraumatic.   Right Ear: External ear normal.   Left Ear: External ear normal.   Nose: Nose normal.   Eyes: Conjunctivae are normal.   Cardiovascular: Normal rate, regular rhythm and normal heart sounds.     Pulmonary/Chest: Effort normal and breath sounds normal.   Abdominal: Soft. Bowel sounds are normal. There is tenderness.   Neurological: She is alert and oriented to person, place, and time.   Psychiatric: She has a normal mood and affect. Her behavior is normal.   Nursing note and vitals reviewed.      Assessment/Plan   Pari was seen today for follow-up.    Diagnoses and all orders for this visit:    Diarrhea, unspecified type  -     Ambulatory Referral to Gastroenterology    Irritable bowel syndrome with both constipation and diarrhea  -     dicyclomine (BENTYL) 20 MG tablet; Take 1 tablet by mouth Every 6 (Six) Hours.  -     Ambulatory Referral to Gastroenterology    Abdominal pain, left lateral  -     traMADol (ULTRAM) 50 MG tablet; Take 1-2 tablets by mouth Every 6 (Six) Hours As Needed for Moderate Pain .  -     Ambulatory Referral to Gastroenterology    External hemorrhoid  -     pramoxine (PROCTOFOAM) 1 % foam; Insert  into the rectum Every 4 (Four) Hours As Needed for Hemorrhoids.    Anxiety  -     hydrOXYzine (VISTARIL) 25 MG capsule; Take 1-2 capsules by mouth 3 (Three) Times a Day As Needed for Anxiety.      Medications refilled  Temporary pain medication provided  Released to return to work on 8/28/17.   Referred to Dr. Villarreal per patient request.     Source of abdominal and back pain could be related to neuropathic pain. If GI evaluation continues to be normal, consider treating with amitriptyline, gabapentin, or lyrica. She has previously been prescribed Cymbalta, however didn't like taking that medication.

## 2017-08-25 NOTE — PATIENT INSTRUCTIONS
Go to the nearest ER or return to clinic if symptoms worsen, fever/chill develop      Diarrhea, Adult  Diarrhea is when you have loose and water poop (stool) often. Diarrhea can make you feel weak and cause you to get dehydrated. Dehydration can make you tired and thirsty, make you have a dry mouth, and make it so you pee (urinate) less often. Diarrhea often lasts 2-3 days. However, it can last longer if it is a sign of something more serious. It is important to treat your diarrhea as told by your doctor.  HOME CARE  Eating and Drinking  Follow these recommendations as told by your doctor:  · Take an oral rehydration solution (ORS). This is a drink that is sold at pharmacies and stores.  · Drink clear fluids, such as:    Water.    Ice chips.    Diluted fruit juice.    Low-calorie sports drinks.  · Eat bland, easy-to-digest foods in small amounts as you are able. These foods include:    Bananas.    Applesauce.    Rice.    Low-fat (lean) meats.    Toast.    Crackers.  · Avoid drinking fluids that contain a lot of sugar or caffeine in them, such as:    Energy drinks.    Sports drinks.    Soda.  · Avoid alcohol.  · Avoid spicy or fatty foods.  General Instructions  · Drink enough fluid to keep your pee (urine) clear or pale yellow.  · Wash your hands often. If you cannot use soap and water, use hand .  · Make sure that all people in your home wash their hands well and often.  · Take over-the-counter and prescription medicines only as told by your doctor.  · Rest at home while you get better.  · Watch your condition for any changes.  · Take a warm bath to help with any burning or pain from having diarrhea.  · Keep all follow-up visits as told by your doctor. This is important.  GET HELP IF:  · You have a fever.  · Your diarrhea gets worse.  · You have new symptoms.  · You cannot keep fluids down.  · You feel light-headed or dizzy.  · You have a headache.  · You have muscle cramps.  GET HELP RIGHT AWAY IF:  · You  have chest pain.  · You feel very weak or you pass out (faint).  · You have bloody or black poop or poop that look like tar.  · You have very bad pain, cramping, or bloating in your belly (abdomen).  · You have trouble breathing or you are breathing very quickly.  · Your heart is beating very quickly.  · Your skin feels cold and clammy.  · You feel confused.  · You have signs of dehydration, such as:    Dark pee, hardly any pee, or no pee.    Cracked lips.    Dry mouth.    Sunken eyes.    Sleepiness.    Weakness.     This information is not intended to replace advice given to you by your health care provider. Make sure you discuss any questions you have with your health care provider.     Document Released: 06/05/2009 Document Revised: 04/10/2017 Document Reviewed: 08/23/2016  [employer] Interactive Patient Education ©2017 [employer]

## 2017-09-18 ENCOUNTER — OFFICE VISIT (OUTPATIENT)
Dept: FAMILY MEDICINE CLINIC | Facility: CLINIC | Age: 42
End: 2017-09-18

## 2017-09-18 VITALS
DIASTOLIC BLOOD PRESSURE: 70 MMHG | HEIGHT: 68 IN | TEMPERATURE: 98.5 F | HEART RATE: 88 BPM | BODY MASS INDEX: 25.01 KG/M2 | RESPIRATION RATE: 16 BRPM | WEIGHT: 165 LBS | SYSTOLIC BLOOD PRESSURE: 115 MMHG

## 2017-09-18 DIAGNOSIS — R11.2 NON-INTRACTABLE VOMITING WITH NAUSEA, UNSPECIFIED VOMITING TYPE: ICD-10-CM

## 2017-09-18 DIAGNOSIS — K59.1 FUNCTIONAL DIARRHEA: Primary | ICD-10-CM

## 2017-09-18 PROCEDURE — 99213 OFFICE O/P EST LOW 20 MIN: CPT | Performed by: FAMILY MEDICINE

## 2017-09-18 RX ORDER — OMEPRAZOLE 20 MG/1
CAPSULE, DELAYED RELEASE ORAL
Refills: 3 | COMMUNITY
Start: 2017-08-30 | End: 2019-07-09

## 2017-09-18 NOTE — PATIENT INSTRUCTIONS
Go to the nearest ER or return to clinic if symptoms worsen, fever/chill develop    Diarrhea, Adult  Diarrhea is when you have loose and water poop (stool) often. Diarrhea can make you feel weak and cause you to get dehydrated. Dehydration can make you tired and thirsty, make you have a dry mouth, and make it so you pee (urinate) less often. Diarrhea often lasts 2-3 days. However, it can last longer if it is a sign of something more serious. It is important to treat your diarrhea as told by your doctor.  HOME CARE  Eating and Drinking  Follow these recommendations as told by your doctor:  · Take an oral rehydration solution (ORS). This is a drink that is sold at pharmacies and stores.  · Drink clear fluids, such as:    Water.    Ice chips.    Diluted fruit juice.    Low-calorie sports drinks.  · Eat bland, easy-to-digest foods in small amounts as you are able. These foods include:    Bananas.    Applesauce.    Rice.    Low-fat (lean) meats.    Toast.    Crackers.  · Avoid drinking fluids that contain a lot of sugar or caffeine in them, such as:    Energy drinks.    Sports drinks.    Soda.  · Avoid alcohol.  · Avoid spicy or fatty foods.  General Instructions  · Drink enough fluid to keep your pee (urine) clear or pale yellow.  · Wash your hands often. If you cannot use soap and water, use hand .  · Make sure that all people in your home wash their hands well and often.  · Take over-the-counter and prescription medicines only as told by your doctor.  · Rest at home while you get better.  · Watch your condition for any changes.  · Take a warm bath to help with any burning or pain from having diarrhea.  · Keep all follow-up visits as told by your doctor. This is important.  GET HELP IF:  · You have a fever.  · Your diarrhea gets worse.  · You have new symptoms.  · You cannot keep fluids down.  · You feel light-headed or dizzy.  · You have a headache.  · You have muscle cramps.  GET HELP RIGHT AWAY IF:  · You  have chest pain.  · You feel very weak or you pass out (faint).  · You have bloody or black poop or poop that look like tar.  · You have very bad pain, cramping, or bloating in your belly (abdomen).  · You have trouble breathing or you are breathing very quickly.  · Your heart is beating very quickly.  · Your skin feels cold and clammy.  · You feel confused.  · You have signs of dehydration, such as:    Dark pee, hardly any pee, or no pee.    Cracked lips.    Dry mouth.    Sunken eyes.    Sleepiness.    Weakness.     This information is not intended to replace advice given to you by your health care provider. Make sure you discuss any questions you have with your health care provider.     Document Released: 06/05/2009 Document Revised: 04/10/2017 Document Reviewed: 08/23/2016  [employer] Interactive Patient Education ©2017 [employer]

## 2017-09-18 NOTE — PROGRESS NOTES
Subjective   Pari Ortiz is a 42 y.o. female.     History of Present Illness     She has a chronic history of abdominal pain, cramping, diarrhea.   She had changed her diet, no caffeine or processed food and symptoms greatly improved for weeks.   Yesterday, she ate [employer] and since then, she has had vomiting, abdominal pain, diarrhea. She treated with phenergan and dicyclomine, little relief. She was unable to go to work today due to vomiting.   She has been referred to Dr. Villarreal, gastroenterology, but she hasn't been able to schedule the appointment due to work schedule.   Symptoms improved with phenergan, able to tolerate fluids.     The following portions of the patient's history were reviewed and updated as appropriate: allergies, current medications, past family history, past medical history, past social history, past surgical history and problem list.    Review of Systems   Constitutional: Negative for chills and fever.   Respiratory: Negative for cough.    Cardiovascular: Negative for chest pain.   Gastrointestinal: Positive for abdominal pain, diarrhea, nausea and vomiting.   Neurological: Negative for dizziness and headaches.       Objective   Physical Exam   Constitutional: She is oriented to person, place, and time. She appears well-developed and well-nourished.   HENT:   Head: Normocephalic and atraumatic.   Right Ear: External ear normal.   Left Ear: External ear normal.   Nose: Nose normal.   Eyes: Conjunctivae are normal.   Cardiovascular: Normal rate, regular rhythm and normal heart sounds.    Pulmonary/Chest: Effort normal and breath sounds normal.   Abdominal: Soft. She exhibits no distension. Bowel sounds are increased. There is no tenderness. There is no guarding.   Musculoskeletal: She exhibits no deformity.   Neurological: She is alert and oriented to person, place, and time.   Skin: Skin is warm and dry.   Nursing note and vitals reviewed.      Assessment/Plan   Pari was seen  today for abdominal cramping & vomiting & diarrhea.    Diagnoses and all orders for this visit:    Functional diarrhea    Non-intractable vomiting with nausea, unspecified vomiting type      Symptoms improving. Advised her to resume diet that eliminates processed food, fried foods. FODMAP diet information provided to her.   Work excuse provided for today

## 2017-09-20 ENCOUNTER — TELEPHONE (OUTPATIENT)
Dept: FAMILY MEDICINE CLINIC | Facility: CLINIC | Age: 42
End: 2017-09-20

## 2017-09-20 RX ORDER — NAPROXEN 500 MG/1
500 TABLET ORAL 2 TIMES DAILY WITH MEALS
Qty: 60 TABLET | Refills: 1 | Status: SHIPPED | OUTPATIENT
Start: 2017-09-20 | End: 2020-03-25

## 2017-09-20 NOTE — TELEPHONE ENCOUNTER
----- Message from Tonia Tang sent at 9/20/2017  8:53 AM EDT -----  Contact: CLEMENTE  PATIENT ASKING FOR PAIN MEDICATION. NON-NARCOTIC.     [facility] [place]

## 2017-09-21 ENCOUNTER — TELEPHONE (OUTPATIENT)
Dept: FAMILY MEDICINE CLINIC | Facility: CLINIC | Age: 42
End: 2017-09-21

## 2017-09-21 NOTE — TELEPHONE ENCOUNTER
----- Message from Tonia Dinero sent at 9/21/2017 11:20 AM EDT -----  Contact: CLEMENTE;PT CALLED  PT CALLED TO REPORT SHE IS NOT BETTER; SHE IS GOING ON A LIQUID FAST;  CAN YOU EXCUSE HER FROM WORK FOR THE REST OF THE WEEK AND RETURN  ON Monday; SHE HAS AN APPT WITH DR CASE ON OCT 9    [phone]  MESSAGE OK

## 2017-10-03 RX ORDER — DULOXETIN HYDROCHLORIDE 60 MG/1
CAPSULE, DELAYED RELEASE ORAL
Qty: 30 CAPSULE | Refills: 1 | Status: SHIPPED | OUTPATIENT
Start: 2017-10-03 | End: 2019-07-09

## 2017-10-04 ENCOUNTER — TELEPHONE (OUTPATIENT)
Dept: FAMILY MEDICINE CLINIC | Facility: CLINIC | Age: 42
End: 2017-10-04

## 2019-07-09 ENCOUNTER — OFFICE VISIT (OUTPATIENT)
Dept: FAMILY MEDICINE CLINIC | Facility: CLINIC | Age: 44
End: 2019-07-09

## 2019-07-09 VITALS
RESPIRATION RATE: 16 BRPM | HEART RATE: 88 BPM | BODY MASS INDEX: 27.62 KG/M2 | TEMPERATURE: 99.7 F | DIASTOLIC BLOOD PRESSURE: 80 MMHG | WEIGHT: 179 LBS | SYSTOLIC BLOOD PRESSURE: 112 MMHG

## 2019-07-09 DIAGNOSIS — F17.210 CIGARETTE NICOTINE DEPENDENCE WITHOUT COMPLICATION: ICD-10-CM

## 2019-07-09 DIAGNOSIS — K64.4 EXTERNAL HEMORRHOID: Primary | ICD-10-CM

## 2019-07-09 DIAGNOSIS — K58.0 IRRITABLE BOWEL SYNDROME WITH DIARRHEA: ICD-10-CM

## 2019-07-09 PROBLEM — R11.2 NON-INTRACTABLE VOMITING WITH NAUSEA: Status: RESOLVED | Noted: 2017-08-07 | Resolved: 2019-07-09

## 2019-07-09 PROCEDURE — 99203 OFFICE O/P NEW LOW 30 MIN: CPT | Performed by: PHYSICIAN ASSISTANT

## 2019-07-09 RX ORDER — HYDROCORTISONE ACETATE 25 MG/1
25 SUPPOSITORY RECTAL 2 TIMES DAILY PRN
Qty: 12 EACH | Refills: 0 | Status: SHIPPED | OUTPATIENT
Start: 2019-07-09 | End: 2020-03-25

## 2019-07-09 NOTE — PROGRESS NOTES
Subjective   Pari Ortzi is a 44 y.o. female.     History of Present Illness   Pt presents with CC of hemorrhoids. Flaring up since last Wednesday. Has had in the past, however this is very painful. Some bleeding when wiping. Has tried sitz baths, OTC hemorrhoid cream, wipes and which robert without relief   Hx of IBS-D. Tried increasing fiber, changing diet- nothing seems to help. Has had colonoscopies in the past X 2. Normal   Tried bentyl, did not not like having to take   Pt is a smoker     Currently without insurance  Plans on getting on with [employer] soon     Due for physical. Family hx of cardiac issues   Gets skipped heart beats pretty frequently     The following portions of the patient's history were reviewed and updated as appropriate: allergies, current medications, past family history, past medical history, past social history, past surgical history and problem list.    Review of Systems   Constitutional: Negative.  Negative for chills, diaphoresis, fatigue and fever.   HENT: Negative.  Negative for congestion, ear discharge, ear pain, hearing loss, nosebleeds, postnasal drip, sinus pressure, sneezing and sore throat.    Eyes: Negative.    Respiratory: Negative.  Negative for cough, chest tightness, shortness of breath and wheezing.    Cardiovascular: Negative.  Negative for chest pain, palpitations and leg swelling.   Gastrointestinal: Positive for anal bleeding and diarrhea. Negative for abdominal distention, abdominal pain, blood in stool, constipation, nausea and vomiting.        Hemorrhoid     Genitourinary: Negative.  Negative for difficulty urinating, dysuria, flank pain, frequency, hematuria and urgency.   Musculoskeletal: Negative.  Negative for arthralgias, back pain, gait problem, joint swelling, myalgias, neck pain and neck stiffness.   Skin: Negative.  Negative for color change, pallor, rash and wound.   Neurological: Negative for dizziness, syncope, weakness, light-headedness, numbness and  headaches.       Objective    Blood pressure 112/80, pulse 88, temperature 99.7 °F (37.6 °C), temperature source Temporal, resp. rate 16, weight 81.2 kg (179 lb).     Physical Exam   Constitutional: She is oriented to person, place, and time. She appears well-developed and well-nourished.   HENT:   Head: Normocephalic and atraumatic.   Right Ear: External ear normal.   Left Ear: External ear normal.   Nose: Nose normal.   Mouth/Throat: Oropharynx is clear and moist. No oropharyngeal exudate.   Eyes: Conjunctivae are normal.   Neck: Normal range of motion. Neck supple. No tracheal deviation present. No thyromegaly present.   Cardiovascular: Normal rate.   Slight irregular heart sound auscultated    Pulmonary/Chest: Effort normal and breath sounds normal. No respiratory distress. She has no wheezes. She has no rales. She exhibits no tenderness.   Abdominal: Soft. Bowel sounds are normal. She exhibits no distension and no mass. There is no tenderness. There is no rebound and no guarding. No hernia.   Genitourinary: Rectal exam shows external hemorrhoid. Pelvic exam was performed with patient in the knee-chest position.   Genitourinary Comments: Larger firm, tender hemorrhoid in 11 o'clock position, small less tender hemorrhoid in 6 o'clock position    Lymphadenopathy:     She has no cervical adenopathy.   Neurological: She is alert and oriented to person, place, and time.   Skin: Skin is warm and dry.   Psychiatric: She has a normal mood and affect. Her behavior is normal. Judgment and thought content normal.   Nursing note and vitals reviewed.      Assessment/Plan   Pari was seen today for hemorrhoids.    Diagnoses and all orders for this visit:    External hemorrhoid  -     hydrocortisone-pramoxine (PROCTOFOAM HC) 1-1 % rectal foam; Insert 1 applicator into the rectum 3 (Three) Times a Day.  -     hydrocortisone (ANUSOL-HC) 25 MG suppository; Insert 1 suppository into the rectum 2 (Two) Times a Day As Needed for  Hemorrhoids.    Irritable bowel syndrome with diarrhea  -     rifaximin (XIFAXAN) 550 MG tablet; Take 1 tablet by mouth Every 8 (Eight) Hours.    Cigarette nicotine dependence without complication    treatment as outlined in plan. Hemorrhoid appears close if not already thrombosed. If not improving, referral to colorectal   Xifazan samples provided to help with IBS-D     Once insurance is in place encourage annual physical with EKG and fasting labs. Pt agrees       I advised Pari of the risks of continuing to use tobacco, and I provided her with tobacco cessation educational materials in the After Visit Summary.     During this visit, I spent 3 minutes counseling the patient regarding tobacco cessation.

## 2019-07-10 ENCOUNTER — TELEPHONE (OUTPATIENT)
Dept: FAMILY MEDICINE CLINIC | Facility: CLINIC | Age: 44
End: 2019-07-10

## 2019-07-10 NOTE — TELEPHONE ENCOUNTER
----- Message from Chitra Beck sent at 7/10/2019  1:33 PM EDT -----  Contact: LAURA / PT CALL  PT CALLED REQUESTING AN ADDITIONAL DAY TO BE OFF WORK - SHE IS HEALING BUT NEEDS ONE MORE DAY. LAURA HAD PT OFF 7/9 AND  7/10 EXTEND TO 7/11.    PT CALL BACK [phone]

## 2019-07-11 ENCOUNTER — TELEPHONE (OUTPATIENT)
Dept: FAMILY MEDICINE CLINIC | Facility: CLINIC | Age: 44
End: 2019-07-11

## 2020-01-27 ENCOUNTER — OFFICE VISIT (OUTPATIENT)
Dept: FAMILY MEDICINE CLINIC | Facility: CLINIC | Age: 45
End: 2020-01-27

## 2020-01-27 VITALS
RESPIRATION RATE: 18 BRPM | TEMPERATURE: 97.5 F | DIASTOLIC BLOOD PRESSURE: 88 MMHG | BODY MASS INDEX: 27.92 KG/M2 | HEIGHT: 68 IN | SYSTOLIC BLOOD PRESSURE: 118 MMHG | HEART RATE: 79 BPM | OXYGEN SATURATION: 99 % | WEIGHT: 184.2 LBS

## 2020-01-27 DIAGNOSIS — S39.92XA INJURY OF BACK, INITIAL ENCOUNTER: ICD-10-CM
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
## 2020-01-27 DIAGNOSIS — S29.9XXA RIB INJURY: ICD-10-CM

## 2020-01-27 PROCEDURE — 99214 OFFICE O/P EST MOD 30 MIN: CPT | Performed by: FAMILY MEDICINE

## 2020-01-27 RX ORDER — PREDNISONE 20 MG/1
TABLET ORAL
Qty: 16 TABLET | Refills: 0 | Status: SHIPPED | OUTPATIENT
Start: 2020-01-27 | End: 2020-03-25

## 2020-01-27 RX ORDER — CYCLOBENZAPRINE HCL 10 MG
10 TABLET ORAL 3 TIMES DAILY PRN
Qty: 60 TABLET | Refills: 0 | Status: SHIPPED | OUTPATIENT
Start: 2020-01-27 | End: 2020-03-25

## 2020-01-27 RX ORDER — HYDROCODONE BITARTRATE AND ACETAMINOPHEN 7.5; 325 MG/1; MG/1
1 TABLET ORAL EVERY 4 HOURS PRN
Qty: 15 TABLET | Refills: 0 | Status: SHIPPED | OUTPATIENT
Start: 2020-01-27 | End: 2020-03-25

## 2020-01-27 NOTE — PROGRESS NOTES
Subjective   Pari Ortiz is a 44 y.o. female.   Chief Complaint   Patient presents with   • Fall     1/24 lower L side of back     Fall   The accident occurred 3 to 5 days ago. The fall occurred while walking (steps). She fell from a height of 3 to 5 ft. Impact surface: steps. There was no blood loss. Point of impact: left back. The pain is present in the back. The pain is at a severity of 10/10. The pain is severe. The symptoms are aggravated by sitting and standing. Pertinent negatives include no bowel incontinence, numbness or tingling. She has tried ice and NSAID for the symptoms. The treatment provided mild relief.   Since the fall, she has had significant tenderness along left lower ribs and the left paraspinal lumbar area.  She does have bruising in the left lumbar region.    The following portions of the patient's history were reviewed and updated as appropriate: allergies, current medications, past family history, past medical history, past social history, past surgical history and problem list.    Review of Systems   Constitutional: Negative.    Respiratory: Negative for cough and shortness of breath.    Cardiovascular: Positive for chest pain (left lower ribs).   Gastrointestinal: Negative for bowel incontinence.   Musculoskeletal: Positive for back pain and gait problem.   Neurological: Negative for tingling and numbness.   Psychiatric/Behavioral: Negative for behavioral problems.       Objective   Physical Exam   Constitutional: She appears well-developed and well-nourished.   HENT:   Head: Normocephalic and atraumatic.   Right Ear: External ear normal.   Left Ear: External ear normal.   Nose: Nose normal.   Eyes: Conjunctivae are normal.   Cardiovascular: Normal rate, regular rhythm and normal heart sounds.   Pulmonary/Chest: Effort normal and breath sounds normal. She exhibits tenderness (left lower ribs, midaxillary line ).   Musculoskeletal: She exhibits no deformity.        Lumbar back: She  exhibits decreased range of motion, tenderness (left paraspinal), edema (left paraspinal), pain and spasm. She exhibits no bony tenderness.        Arms:  Neurological: She is alert.   Skin: Skin is warm.   Psychiatric: Her behavior is normal.   Nursing note and vitals reviewed.        Assessment/Plan   Pari was seen today for fall.    Diagnoses and all orders for this visit:    Fall, initial encounter  -     XR Spine Lumbar 4+ View  -     XR Ribs Left With PA Chest  -     HYDROcodone-acetaminophen (NORCO) 7.5-325 MG per tablet; Take 1 tablet by mouth Every 4 (Four) Hours As Needed for Mild Pain .  -     predniSONE (DELTASONE) 20 MG tablet; Take 3 tabs po qd x 2 days, 2 tabs po qd x 3 days, 1 tab po qd x 3 days, 1/2 tab po qd x 2 days  -     cyclobenzaprine (FLEXERIL) 10 MG tablet; Take 1 tablet by mouth 3 (Three) Times a Day As Needed for Muscle Spasms.    Rib injury  -     XR Ribs Left With PA Chest  -     HYDROcodone-acetaminophen (NORCO) 7.5-325 MG per tablet; Take 1 tablet by mouth Every 4 (Four) Hours As Needed for Mild Pain .  -     predniSONE (DELTASONE) 20 MG tablet; Take 3 tabs po qd x 2 days, 2 tabs po qd x 3 days, 1 tab po qd x 3 days, 1/2 tab po qd x 2 days  -     cyclobenzaprine (FLEXERIL) 10 MG tablet; Take 1 tablet by mouth 3 (Three) Times a Day As Needed for Muscle Spasms.    Injury of back, initial encounter  -     XR Spine Lumbar 4+ View  -     HYDROcodone-acetaminophen (NORCO) 7.5-325 MG per tablet; Take 1 tablet by mouth Every 4 (Four) Hours As Needed for Mild Pain .  -     predniSONE (DELTASONE) 20 MG tablet; Take 3 tabs po qd x 2 days, 2 tabs po qd x 3 days, 1 tab po qd x 3 days, 1/2 tab po qd x 2 days  -     cyclobenzaprine (FLEXERIL) 10 MG tablet; Take 1 tablet by mouth 3 (Three) Times a Day As Needed for Muscle Spasms.      X-ray to evaluate lumbar spine and left ribs due to significant tenderness in these areas.  Muscle relaxer, steroid taper, and temporary supply pain medication  provided.  Encouraged her to continue cold compresses over the next 48 hours as needed to affected areas for pain relief.  After that, she can alternate warm and cold compresses.  DEANNA query complete. Treatment plan to include limited course of prescribed controlled substance. Risks including addiction, benefits, and alternatives presented to patient.

## 2020-01-27 NOTE — PATIENT INSTRUCTIONS
Go to the nearest ER or return to clinic if symptoms worsen, fever/chill develop    Rib Fracture    A rib fracture is a break or crack in one of the bones of the ribs. The ribs are like a cage that goes around your upper chest. A broken or cracked rib is often painful, but most do not cause other problems. Most rib fractures usually heal on their own in 1-3 months.  Follow these instructions at home:  Managing pain, stiffness, and swelling  · If directed, apply ice to the injured area.  ? Put ice in a plastic bag.  ? Place a towel between your skin and the bag.  ? Leave the ice on for 20 minutes, 2-3 times a day.  · Take over-the-counter and prescription medicines only as told by your doctor.  Activity  · Avoid activities that cause pain to the injured area. Protect your injured area.  · Slowly increase activity as told by your doctor.  General instructions  · Do deep breathing as told by your doctor. You may be told to:  ? Take deep breaths many times a day.  ? Cough many times a day while hugging a pillow.  ? Use a device (incentive spirometer) to do deep breathing many times a day.  · Drink enough fluid to keep your pee (urine) clear or pale yellow.  · Do not wear a rib belt or binder. These do not allow you to breathe deeply.  · Keep all follow-up visits as told by your doctor. This is important.  Contact a doctor if:  · You have a fever.  Get help right away if:  · You have trouble breathing.  · You are short of breath.  · You cannot stop coughing.  · You cough up thick or bloody spit (sputum).  · You feel sick to your stomach (nauseous), throw up (vomit), or have belly (abdominal) pain.  · Your pain gets worse and medicine does not help.  Summary  · A rib fracture is a break or crack in one of the bones of the ribs.  · Apply ice to the injured area and take medicines for pain as told by your doctor.  · Take deep breaths and cough many times a day. Hug a pillow every time you cough.  This information is not  intended to replace advice given to you by your health care provider. Make sure you discuss any questions you have with your health care provider.  Document Released: 09/26/2009 Document Revised: 03/20/2018 Document Reviewed: 03/20/2018  Elsevier Interactive Patient Education © 2019 [employer]

## 2020-01-30 ENCOUNTER — TELEPHONE (OUTPATIENT)
Dept: FAMILY MEDICINE CLINIC | Facility: CLINIC | Age: 45
End: 2020-01-30

## 2020-01-30 RX ORDER — HYDROXYZINE HYDROCHLORIDE 25 MG/1
25 TABLET, FILM COATED ORAL EVERY 6 HOURS PRN
Qty: 30 TABLET | Refills: 0 | Status: SHIPPED | OUTPATIENT
Start: 2020-01-30 | End: 2020-03-25 | Stop reason: SDUPTHER

## 2020-01-30 NOTE — TELEPHONE ENCOUNTER
Patient called and stated that she is still in a lot of pain, when she cough her ribs pop. Patients main concern is regarding returning to work if she would be up to par and if not she is needing a work excuse. Patient stated that she is having anxiety and having moments when she is very upset. Patient wants to know if there is something that can be prescribed for anxiety.    Please advise.  Patient callback: [phone]    Pharmacy: [place] [address] [place] [address].

## 2020-01-30 NOTE — TELEPHONE ENCOUNTER
If she is still having significant symptoms, ok to remain off from work for the remainder of this week.   Will send medication to pharmacy for her to take for anxiety as needed.

## 2020-02-03 ENCOUNTER — TELEPHONE (OUTPATIENT)
Dept: FAMILY MEDICINE CLINIC | Facility: CLINIC | Age: 45
End: 2020-02-03

## 2020-02-06 ENCOUNTER — TELEPHONE (OUTPATIENT)
Dept: FAMILY MEDICINE CLINIC | Facility: CLINIC | Age: 45
End: 2020-02-06

## 2020-02-06 NOTE — TELEPHONE ENCOUNTER
Spoke with the patient, she stated that she doesn't have insurance until the end of the month and can't afford to pay for the Xray since she has been out of work. She is very upset and doesn't know what to do because she doesn't want to loose this job due to not being able to get imaging. She wanted to know if she came in tomorrow would you be able to exam her physically and put her off work or if she has to have the imaging completed.

## 2020-02-06 NOTE — TELEPHONE ENCOUNTER
Patient called in stating that she is not healed enough to go to work. Patient was seen Monday 1/27/2020. She states that she can't do any lifting. Patient states that she needs an excuse from work. States that she was told by doctor that healing time could be up to 4 weeks.    Pt Contact: [phone]

## 2020-02-07 ENCOUNTER — TELEPHONE (OUTPATIENT)
Dept: FAMILY MEDICINE CLINIC | Facility: CLINIC | Age: 45
End: 2020-02-07

## 2020-02-07 NOTE — TELEPHONE ENCOUNTER
patient called in today stating that she was suppose to get an XRAY this morning 02=07-20 and she did not go to the xray, and needs the work excuse for January 28th - febuary th 6th, wont be able to pick it up until Monday -2-10-20 due to the weather

## 2020-03-19 ENCOUNTER — HOSPITAL ENCOUNTER (OUTPATIENT)
Dept: GENERAL RADIOLOGY | Facility: HOSPITAL | Age: 45
Discharge: HOME | End: 2020-03-19
Admitting: FAMILY MEDICINE

## 2020-03-19 PROCEDURE — 72110 X-RAY EXAM L-2 SPINE 4/>VWS: CPT

## 2020-03-19 PROCEDURE — 71101 X-RAY EXAM UNILAT RIBS/CHEST: CPT

## 2020-03-23 ENCOUNTER — TELEPHONE (OUTPATIENT)
Dept: FAMILY MEDICINE CLINIC | Facility: CLINIC | Age: 45
End: 2020-03-23

## 2020-03-23 NOTE — TELEPHONE ENCOUNTER
PATIENT CALLED STATING SHE HAD XRAYS DONE ON Thursday THE 19TH OF BACK, SIDE AND RIBS AND WANTED TO KNOW IF [facility] WAS AWARE AND IF SO HAS SHE HAD A CHANCE TO REVIEW.   PLEASE ADVISE [id number]

## 2020-03-23 NOTE — TELEPHONE ENCOUNTER
"Pt aware Dr Santos needs to review results however there is nothing urgent on them.     Pt understood and is still unable to lay on L side. \"Feels like a baseball.\" Transferred her to front to schedule appt when Dr Santos returns to office.   "

## 2020-03-25 ENCOUNTER — OFFICE VISIT (OUTPATIENT)
Dept: FAMILY MEDICINE CLINIC | Facility: CLINIC | Age: 45
End: 2020-03-25

## 2020-03-25 VITALS
DIASTOLIC BLOOD PRESSURE: 86 MMHG | BODY MASS INDEX: 27.58 KG/M2 | TEMPERATURE: 98 F | SYSTOLIC BLOOD PRESSURE: 134 MMHG | HEART RATE: 102 BPM | WEIGHT: 182 LBS | HEIGHT: 68 IN | OXYGEN SATURATION: 99 %

## 2020-03-25 DIAGNOSIS — S39.92XD INJURY OF BACK, SUBSEQUENT ENCOUNTER: Primary | ICD-10-CM

## 2020-03-25 DIAGNOSIS — F41.9 ANXIETY: ICD-10-CM
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
## 2020-03-25 DIAGNOSIS — S29.9XXA RIB INJURY: ICD-10-CM

## 2020-03-25 DIAGNOSIS — R31.9 HEMATURIA, UNSPECIFIED TYPE: ICD-10-CM

## 2020-03-25 LAB
BILIRUB BLD-MCNC: NEGATIVE MG/DL
CLARITY, POC: CLEAR
COLOR UR: YELLOW
GLUCOSE UR STRIP-MCNC: NEGATIVE MG/DL
KETONES UR QL: NEGATIVE
LEUKOCYTE EST, POC: NEGATIVE
NITRITE UR-MCNC: NEGATIVE MG/ML
PH UR: 7.5 [PH] (ref 5–8)
PROT UR STRIP-MCNC: NEGATIVE MG/DL
RBC # UR STRIP: NEGATIVE /UL
SP GR UR: 1.01 (ref 1–1.03)
UROBILINOGEN UR QL: NORMAL

## 2020-03-25 PROCEDURE — 99214 OFFICE O/P EST MOD 30 MIN: CPT | Performed by: FAMILY MEDICINE

## 2020-03-25 PROCEDURE — 81002 URINALYSIS NONAUTO W/O SCOPE: CPT | Performed by: FAMILY MEDICINE

## 2020-03-25 RX ORDER — IBUPROFEN 800 MG/1
800 TABLET ORAL EVERY 8 HOURS PRN
COMMUNITY
Start: 2020-03-18 | End: 2020-03-25

## 2020-03-25 RX ORDER — CYCLOBENZAPRINE HCL 10 MG
10 TABLET ORAL 3 TIMES DAILY PRN
Qty: 90 TABLET | Refills: 1 | Status: SHIPPED | OUTPATIENT
Start: 2020-03-25

## 2020-03-25 RX ORDER — IBUPROFEN 800 MG/1
800 TABLET ORAL EVERY 8 HOURS PRN
Qty: 90 TABLET | Refills: 0 | Status: SHIPPED | OUTPATIENT
Start: 2020-03-25

## 2020-03-25 RX ORDER — HYDROXYZINE HYDROCHLORIDE 25 MG/1
25 TABLET, FILM COATED ORAL EVERY 6 HOURS PRN
Qty: 120 TABLET | Refills: 1 | Status: SHIPPED | OUTPATIENT
Start: 2020-03-25 | End: 2020-04-16

## 2020-03-25 RX ORDER — HYDROCODONE BITARTRATE AND ACETAMINOPHEN 7.5; 325 MG/1; MG/1
1 TABLET ORAL EVERY 4 HOURS PRN
Qty: 15 TABLET | Refills: 0 | Status: SHIPPED | OUTPATIENT
Start: 2020-03-25

## 2020-03-25 NOTE — PATIENT INSTRUCTIONS
Go to the nearest ER or return to clinic if symptoms worsen, fever/chill develop    Back Exercises  These exercises help to make your trunk and back strong. They also help to keep the lower back flexible. Doing these exercises can help to prevent back pain or lessen existing pain.  · If you have back pain, try to do these exercises 2-3 times each day or as told by your doctor.  · As you get better, do the exercises once each day. Repeat the exercises more often as told by your doctor.  · To stop back pain from coming back, do the exercises once each day, or as told by your doctor.  Exercises  Single knee to chest  Do these steps 3-5 times in a row for each le. Lie on your back on a firm bed or the floor with your legs stretched out.  2. Bring one knee to your chest.  3. Grab your knee or thigh with both hands and hold them it in place.  4. Pull on your knee until you feel a gentle stretch in your lower back or buttocks.  5. Keep doing the stretch for 10-30 seconds.  6. Slowly let go of your leg and straighten it.  Pelvic tilt  Do these steps 5-10 times in a row:  1. Lie on your back on a firm bed or the floor with your legs stretched out.  2. Bend your knees so they point up to the ceiling. Your feet should be flat on the floor.  3. Tighten your lower belly (abdomen) muscles to press your lower back against the floor. This will make your tailbone point up to the ceiling instead of pointing down to your feet or the floor.  4. Stay in this position for 5-10 seconds while you gently tighten your muscles and breathe evenly.  Cat-cow  Do these steps until your lower back bends more easily:  1. Get on your hands and knees on a firm surface. Keep your hands under your shoulders, and keep your knees under your hips. You may put padding under your knees.  2. Let your head hang down toward your chest. Tighten (contract) the muscles in your belly. Point your tailbone toward the floor so your lower back becomes rounded  like the back of a cat.  3. Stay in this position for 5 seconds.  4. Slowly lift your head. Let the muscles of your belly relax. Point your tailbone up toward the ceiling so your back forms a sagging arch like the back of a cow.  5. Stay in this position for 5 seconds.    Press-ups  Do these steps 5-10 times in a row:  1. Lie on your belly (face-down) on the floor.  2. Place your hands near your head, about shoulder-width apart.  3. While you keep your back relaxed and keep your hips on the floor, slowly straighten your arms to raise the top half of your body and lift your shoulders. Do not use your back muscles. You may change where you place your hands in order to make yourself more comfortable.  4. Stay in this position for 5 seconds.  5. Slowly return to lying flat on the floor.    Bridges  Do these steps 10 times in a row:  1. Lie on your back on a firm surface.  2. Bend your knees so they point up to the ceiling. Your feet should be flat on the floor. Your arms should be flat at your sides, next to your body.  3. Tighten your butt muscles and lift your butt off the floor until your waist is almost as high as your knees. If you do not feel the muscles working in your butt and the back of your thighs, slide your feet 1-2 inches farther away from your butt.  4. Stay in this position for 3-5 seconds.  5. Slowly lower your butt to the floor, and let your butt muscles relax.  If this exercise is too easy, try doing it with your arms crossed over your chest.  Belly crunches  Do these steps 5-10 times in a row:  1. Lie on your back on a firm bed or the floor with your legs stretched out.  2. Bend your knees so they point up to the ceiling. Your feet should be flat on the floor.  3. Cross your arms over your chest.  4. Tip your chin a little bit toward your chest but do not bend your neck.  5. Tighten your belly muscles and slowly raise your chest just enough to lift your shoulder blades a tiny bit off of the floor.  Avoid raising your body higher than that, because it can put too much stress on your low back.  6. Slowly lower your chest and your head to the floor.  Back lifts  Do these steps 5-10 times in a row:  1. Lie on your belly (face-down) with your arms at your sides, and rest your forehead on the floor.  2. Tighten the muscles in your legs and your butt.  3. Slowly lift your chest off of the floor while you keep your hips on the floor. Keep the back of your head in line with the curve in your back. Look at the floor while you do this.  4. Stay in this position for 3-5 seconds.  5. Slowly lower your chest and your face to the floor.  Contact a doctor if:  · Your back pain gets a lot worse when you do an exercise.  · Your back pain does not get better 2 hours after you exercise.  If you have any of these problems, stop doing the exercises. Do not do them again unless your doctor says it is okay.  Get help right away if:  · You have sudden, very bad back pain. If this happens, stop doing the exercises. Do not do them again unless your doctor says it is okay.  This information is not intended to replace advice given to you by your health care provider. Make sure you discuss any questions you have with your health care provider.  Document Released: 01/20/2012 Document Revised: 09/12/2019 Document Reviewed: 09/12/2019  Elsevier Interactive Patient Education © 2020 [employer]

## 2020-03-25 NOTE — PROGRESS NOTES
Subjective   Pari Ortiz is a 44 y.o. female.   Chief Complaint   Patient presents with   • L side pain, fell Klaus     can't lay on that side bc of pain    • Bld in urine     x last wk      History of Present Illness   She fell down steps in Jan 2020. Initially evaluated on 1/27/20. She did complete xray of lumbar spine and left ribs, no fracture seen.   She continues to have left sided rib pain, point tenderness. Also, left lumbar pain remains.   Unable to lay on either left or right side, left low back feels swollen. Pain described as pressure sensation.   Burning sensation in left lumbar spine. No radiation of pain, burning, tingling into lower extremities.   Symptoms relieve with ibuprofen or pain medication, but if she doesn't take anything she is unable tolerate the pain.   She has been working, able to do if she isn't on a job that requires her to bend over.     She did see blood in urine last week. Hasn't seen any this week.   Denies dysuria.     Requesting refill of hydroxyzine to use prn for anxiety.     The following portions of the patient's history were reviewed and updated as appropriate: allergies, current medications, past family history, past medical history, past social history, past surgical history and problem list.    Review of Systems   Constitutional: Negative.    Respiratory: Negative for cough, chest tightness and shortness of breath.    Cardiovascular: Positive for chest pain (left lower ribs). Negative for palpitations.   Musculoskeletal: Positive for back pain. Negative for gait problem.   Neurological: Negative for numbness.   Psychiatric/Behavioral: The patient is nervous/anxious.        Objective   Physical Exam   Constitutional: She appears well-developed and well-nourished.   HENT:   Head: Normocephalic and atraumatic.   Right Ear: External ear normal.   Left Ear: External ear normal.   Nose: Nose normal.   Eyes: Conjunctivae are normal.   Neck: Neck supple.   Cardiovascular:  Normal rate, regular rhythm and normal heart sounds.   No murmur heard.  Pulmonary/Chest: Effort normal and breath sounds normal. She exhibits tenderness (left lower ribs, midaxillary line ).   Musculoskeletal: She exhibits no deformity.        Lumbar back: She exhibits decreased range of motion, tenderness (left paraspinal), edema (left paraspinal), pain and spasm. She exhibits no bony tenderness.   Neurological: She is alert.   Skin: Skin is warm and dry.   Psychiatric: Her behavior is normal.   Nursing note and vitals reviewed.        Assessment/Plan   Pari was seen today for l side pain, fell alden and bld in urine.    Diagnoses and all orders for this visit:    Injury of back, subsequent encounter  -     MRI Lumbar Spine Without Contrast  -     Ambulatory Referral to Physical Therapy Evaluate and treat  -     ibuprofen (ADVIL,MOTRIN) 800 MG tablet; Take 1 tablet by mouth Every 8 (Eight) Hours As Needed for Moderate Pain . for pain    Fall, subsequent encounter  -     MRI Lumbar Spine Without Contrast  -     Ambulatory Referral to Physical Therapy Evaluate and treat  -     ibuprofen (ADVIL,MOTRIN) 800 MG tablet; Take 1 tablet by mouth Every 8 (Eight) Hours As Needed for Moderate Pain . for pain    Rib injury  -     Ambulatory Referral to Physical Therapy Evaluate and treat  -     ibuprofen (ADVIL,MOTRIN) 800 MG tablet; Take 1 tablet by mouth Every 8 (Eight) Hours As Needed for Moderate Pain . for pain  -     HYDROcodone-acetaminophen (NORCO) 7.5-325 MG per tablet; Take 1 tablet by mouth Every 4 (Four) Hours As Needed for Mild Pain .  -     cyclobenzaprine (FLEXERIL) 10 MG tablet; Take 1 tablet by mouth 3 (Three) Times a Day As Needed for Muscle Spasms.    Hematuria, unspecified type  -     POC Urinalysis Dipstick    Anxiety  -     hydrOXYzine (ATARAX) 25 MG tablet; Take 1 tablet by mouth Every 6 (Six) Hours As Needed for Anxiety.    UA is normal.   Medication refilled to manage current symptoms. MRI to  evaluate persistent lumbar pain. Referred to PT for additional treatment and pain relief.

## 2020-04-03 ENCOUNTER — TELEPHONE (OUTPATIENT)
Dept: FAMILY MEDICINE CLINIC | Facility: CLINIC | Age: 45
End: 2020-04-03

## 2020-04-03 NOTE — TELEPHONE ENCOUNTER
Lumbar spine MRI is normal. Incidentally seen, 2 ovarian cysts. Unless these start causing pelvic pain, nothing more to do with them at this time.   She does have a disc desiccation (degenerative disc disease) at T10-11 and T12-L1. This can be secondary to arthritic changes/wear and tear or trauma.     Please provide the requested excuses from the last 6 months.

## 2020-04-03 NOTE — TELEPHONE ENCOUNTER
Informed the patient of her results, she thanked us and verbalized a good understanding. She will stop by and  her last 6 month work notes.

## 2020-04-03 NOTE — TELEPHONE ENCOUNTER
PATIENT CALLED STATING MRI WAS DONE YESTERDAY MORNING AND SHOULD BE RECEIVING RESULTS TODAY      PATIENT ALSO CALLED REQUESTING COPIES OF RETURN TO WORK EXCUSES FOR LAST 6 MONTHS    VERIFIED PATIENT CALL BACK [phone]

## 2020-04-15 ENCOUNTER — TELEPHONE (OUTPATIENT)
Dept: FAMILY MEDICINE CLINIC | Facility: CLINIC | Age: 45
End: 2020-04-15

## 2020-04-15 NOTE — TELEPHONE ENCOUNTER
PATIENT STATES HER THROAT IS SWOLLEN REALLY BAD AND HURTS REALLY BAD.  ITS REALLY PAINFUL AND ALMOST SWOLLEN SHUT. SHE'S BEEN DEALING WITH BAD ALLERGIES SINCE SHE MOVED UP HERE.  SHE SEEMS TO THINK SHE MIGHT NEEDS MORE MEDICATION OR A STEROID SHOT.

## 2020-04-15 NOTE — TELEPHONE ENCOUNTER
Sore throat x last night. Hurts to swallow. Denies fever, nasal drainage. Uvula is enlarged, throat is very red, no blisters. Moved to [place] last Fri and allergies have been awful.Took Clartin D yest and Benadryl 25mg this am, not helping. Sometimes she wakes up w/ her eyes swollen shut. They stay watery and itchy. Took Ibuprofen 800mg just now. Pt aware she may need a video visit.      (Jackeline Jordan)

## 2020-04-15 NOTE — TELEPHONE ENCOUNTER
If her uvula and throat seem to be swollen, she actually needs someone to see her. If she is unable to come here, recommend [facility] or ER for evaluation and treatment.

## 2020-04-16 DIAGNOSIS — F41.9 ANXIETY: ICD-10-CM

## 2020-04-16 RX ORDER — HYDROXYZINE HYDROCHLORIDE 25 MG/1
25 TABLET, FILM COATED ORAL EVERY 6 HOURS PRN
Qty: 120 TABLET | Refills: 1 | Status: SHIPPED | OUTPATIENT
Start: 2020-04-16